# Patient Record
(demographics unavailable — no encounter records)

---

## 2024-11-05 NOTE — ASSESSMENT
[FreeTextEntry1] : Discussed at length with patient exam history and imaging as well as treatment options and at this time patient elects home exercises and observation and follow-up in 5 to 6 weeks if persistent symptoms

## 2024-11-05 NOTE — HISTORY OF PRESENT ILLNESS
[de-identified] : Initial visit: Left knee  Pain  Reason: [employer] Duration:3/2024 Prior studies: X rays ordered  Symptoms: Swelling / Aching / Sharp Aggravating Fx: Alleviating Fx: Knee brace / Icing  Pain level: 0/10 Pain medication: Advil  Medical Hx: None  Surgical Hx: N/A  Current Medication:  Allergies: Penicillin

## 2024-11-05 NOTE — PHYSICAL EXAM
[de-identified] : Left knee  Constitutional:  The patient is healthy-appearing and in no apparent distress.   Gait: The patient ambulates with a normal gait and no limp.  Cardiovascular System:  The capillary refill is less than 2 seconds.   Skin:  There are no skin abnormalities.  Left Knee:   Bony Palpation:  There is no tenderness of the medial joint line.  There is no tenderness of the lateral joint line. There is no tenderness of the medial femoral chondyle. There is no tenderness of the lateral femoral chondyle. There is no tenderness of the tibial tubercle. There is no tenderness of the superior patella. There is no tenderness of the inferior patella. There is tenderness of the medial patellar facet. There is tenderness of the lateral patellar facet.  Soft Tissue Palpation:  There is no tenderness of the medial retinaculum. There is no tenderness of the lateral retinaculum. There is no tenderness of the quadriceps tendon. There is no tenderness of the patella tendon. There is no tenderness of the ITB. There is no tenderness of the pes anserine.  Active Range of Motion:  The range of motion at the knee actively and passively is full.  There is lateral patellar retinaculum tightness/hypomobility.  Special Tests:  There is a negative Apley. There is a negative Steinmanns.  There is a negative Lachman and Anterior Drawer. There is a negative Posterior Drawer.   There is no varus or valgus laxity.  Strength:  There is 5/5 hip flexion and 5/5 knee flexion and extension.    Psychiatric:  The patient demonstrates a normal mood and affect and is active and alert. Alignment: There is external tibial rotation and femoral anteversion.      [de-identified] : X-ray left knee: There is no significant bony / soft tissue abnormality, arthritis, or fracture except mild lateral patella tilt